# Patient Record
Sex: MALE | Race: WHITE | Employment: FULL TIME | URBAN - METROPOLITAN AREA
[De-identification: names, ages, dates, MRNs, and addresses within clinical notes are randomized per-mention and may not be internally consistent; named-entity substitution may affect disease eponyms.]

---

## 2023-07-12 ENCOUNTER — HOSPITAL ENCOUNTER (EMERGENCY)
Age: 58
Discharge: HOME | End: 2023-07-13
Attending: EMERGENCY MEDICINE
Payer: COMMERCIAL

## 2023-07-12 ENCOUNTER — APPOINTMENT (OUTPATIENT)
Dept: GENERAL RADIOLOGY | Age: 58
End: 2023-07-12
Payer: COMMERCIAL

## 2023-07-12 VITALS
DIASTOLIC BLOOD PRESSURE: 101 MMHG | HEIGHT: 71 IN | SYSTOLIC BLOOD PRESSURE: 166 MMHG | OXYGEN SATURATION: 97 % | TEMPERATURE: 97.7 F | HEART RATE: 100 BPM | BODY MASS INDEX: 39.9 KG/M2 | WEIGHT: 285 LBS | RESPIRATION RATE: 17 BRPM

## 2023-07-12 DIAGNOSIS — S99.911A INJURY OF RIGHT ANKLE, INITIAL ENCOUNTER: Primary | ICD-10-CM

## 2023-07-12 LAB
AMPHET UR QL SCN: NEGATIVE
BARBITURATES UR QL SCN: NEGATIVE
BENZODIAZ UR QL: NEGATIVE
CANNABINOIDS UR QL SCN: NEGATIVE
COCAINE UR QL SCN: NEGATIVE
FENTANYL UR QL: NEGATIVE
METHADONE UR QL: NEGATIVE
OPIATES UR QL SCN: NEGATIVE
OXYCODONE UR QL SCN: NEGATIVE
PCP UR QL SCN: NEGATIVE
TEST INFORMATION: NORMAL

## 2023-07-12 PROCEDURE — 80307 DRUG TEST PRSMV CHEM ANLYZR: CPT

## 2023-07-12 PROCEDURE — 73610 X-RAY EXAM OF ANKLE: CPT

## 2023-07-12 PROCEDURE — 99284 EMERGENCY DEPT VISIT MOD MDM: CPT

## 2023-07-12 RX ORDER — ACETAMINOPHEN 500 MG
1000 TABLET ORAL EVERY 8 HOURS PRN
Qty: 120 TABLET | Refills: 0 | Status: SHIPPED | OUTPATIENT
Start: 2023-07-12

## 2023-07-12 ASSESSMENT — PAIN DESCRIPTION - LOCATION: LOCATION: ANKLE

## 2023-07-12 ASSESSMENT — ENCOUNTER SYMPTOMS
ABDOMINAL PAIN: 0
VOMITING: 0
BACK PAIN: 0
SHORTNESS OF BREATH: 0
NAUSEA: 0

## 2023-07-12 ASSESSMENT — PAIN SCALES - GENERAL: PAINLEVEL_OUTOF10: 4

## 2023-07-13 NOTE — ED PROVIDER NOTES
[address] ED  Emergency Department Encounter  Emergency Medicine Resident     Pt Name:Osvaldo Beltre  MRN: [id number]  [facility] 1965  Date of evaluation: 7/12/23  PCP:  No primary care provider on file. Note Started: 10:25 PM EDT      CHIEF COMPLAINT       Chief Complaint   Patient presents with    Ankle Pain     Injured slipping at work; R ankle       HISTORY OF PRESENT ILLNESS  (Location/Symptom, Timing/Onset, Context/Setting, Quality, Duration, Modifying Factors, Severity.)      Bessie Zuniga is a 56 y.o. male who presents with right ankle pain and swelling. Patient was at work this evening when he slipped on a small puddle and twisted his right ankle outward. He did fall to the ground. Did not hit his head. No LOC. Not on blood thinners. Patient did take Tylenol about an hour prior to arrival.  Denies any numbness. Patient was unable to ambulate or bear weight. PAST MEDICAL / SURGICAL / SOCIAL / FAMILY HISTORY      has no past medical history on file. has no past surgical history on file. Social History     Socioeconomic History    Marital status:      Spouse name: Not on file    Number of children: Not on file    Years of education: Not on file    Highest education level: Not on file   Occupational History    Not on file   Tobacco Use    Smoking status: Never    Smokeless tobacco: Never   Substance and Sexual Activity    Alcohol use: Not Currently    Drug use: Never    Sexual activity: Not on file   Other Topics Concern    Not on file   Social History Narrative    Not on file     Social Determinants of Health     Financial Resource Strain: Not on file   Food Insecurity: Not on file   Transportation Needs: Not on file   Physical Activity: Not on file   Stress: Not on file   Social Connections: Not on file   Intimate Partner Violence: Not on file   Housing Stability: Not on file       History reviewed. No pertinent family history.     Allergies:  Advil
Tylenol with some improvement. The patient has had previous right ankle sprains but denies any previous fracture or surgery. He denies any other injury from the fall. The patient denies any hip pain, knee pain, headache, vision changes, neck pain, back pain, chest pain, shortness of breath, abdominal pain, urinary/bowel symptoms, focal weakness, numbness, tingling, or recent illness. Vital signs are stable. Heart regular rate and rhythm. Lungs clear to auscultation. Abdomen soft and nontender. No midline spinal tenderness to palpation, step-off or deformity. He has tenderness to palpation over the bilateral malleoli with associated edema. DP/PT pulses 2+/4 and equal bilaterally. Strength 5/5 with dorsi and plantarflexion of the bilateral feet. No pain over the base of the fifth metatarsal or over the proximal fibular head. No pain over the right hip or right knee. Achilles tendon is intact. Plan to obtain x-ray of the right ankle. He declines any additional pain medication. An ice pack was applied. We will complete Workmen's Comp. urine drug screen and paperwork.             Michelle Mayfield DO,  DO  Attending Emergency Physician            Michelle Mayfield DO  07/12/23 [id number]

## 2023-07-13 NOTE — DISCHARGE INSTRUCTIONS
You were seen in the emergency department for right ankle pain and swelling after slipping and falling at work this evening. X-ray of your right ankle was done which showed no acute fracture. We will place you in a walking boot and provide crutches as you are unable to bear weight and ambulate on your own. We will place you on light work duty. You may take Tylenol 1000 mg every 8 hours as needed for pain. Recommend ice and elevation. Please follow-up with orthopedic surgery in 1 week for reevaluation. Please return to the emergency department if your pain and swelling worsens or you develop any numbness in your right foot.

## 2023-07-13 NOTE — ED NOTES
The following labs labeled with pt sticker and tubed to lab:     [] Blue     [] Lavender   [] on ice  [] Green/yellow  [] Green/black [] on ice  [] Yellow  [] Red  [] Pink      [] COVID-19 swab    [] Rapid  [] PCR  [] Flu Swab  [] Strep Swab  [] Peds Viral Panel     [x] Urine Sample  [] Pelvic Cultures  [] Blood Cultures   [] Wound Cultures        Jaden Disla RN  07/12/23 [id number]

## 2023-07-13 NOTE — ED NOTES
Pt presents to the ED with c/o R ankle pain/injury. Pt states he was at work and slipped on a wet spot on the ground and fell injuring his R ankle. Pt admits twisting the ankle when he fell. Admits 4/10. Pt states he is unable to bear weight on his ankle. He admits Tylenol for pain at 1930  He denies any other complaints. He denies LOC. Whiteboard updated, vitals obtained, call light in reach.      Odalys Wolf RN  07/12/23 [id number]

## 2023-07-25 ENCOUNTER — OFFICE VISIT (OUTPATIENT)
Dept: ORTHOPEDIC SURGERY | Age: 58
End: 2023-07-25
Payer: COMMERCIAL

## 2023-07-25 VITALS — BODY MASS INDEX: 39.9 KG/M2 | OXYGEN SATURATION: 100 % | WEIGHT: 285 LBS | HEIGHT: 71 IN | RESPIRATION RATE: 16 BRPM

## 2023-07-25 DIAGNOSIS — M25.571 RIGHT ANKLE PAIN, UNSPECIFIED CHRONICITY: Primary | ICD-10-CM

## 2023-07-25 DIAGNOSIS — T14.8XXA AVULSION FRACTURE: ICD-10-CM

## 2023-07-25 DIAGNOSIS — S93.409A SPRAIN OF LIGAMENT OF ANKLE, INITIAL ENCOUNTER: Primary | ICD-10-CM

## 2023-07-25 PROCEDURE — 99204 OFFICE O/P NEW MOD 45 MIN: CPT | Performed by: ORTHOPAEDIC SURGERY

## 2023-07-25 RX ORDER — ASPIRIN 325 MG
325 TABLET, DELAYED RELEASE (ENTERIC COATED) ORAL DAILY
Qty: 42 TABLET | Refills: 0 | Status: SHIPPED | OUTPATIENT
Start: 2023-07-25

## 2023-07-26 ENCOUNTER — HOSPITAL ENCOUNTER (OUTPATIENT)
Dept: GENERAL RADIOLOGY | Age: 58
Discharge: HOME | End: 2023-07-28
Payer: COMMERCIAL

## 2023-07-26 ENCOUNTER — HOSPITAL ENCOUNTER (OUTPATIENT)
Age: 58
Discharge: HOME | End: 2023-07-28

## 2023-07-26 DIAGNOSIS — T14.8XXA FOREIGN BODY IN SKIN: Primary | ICD-10-CM

## 2023-07-26 DIAGNOSIS — T14.8XXA FOREIGN BODY IN SKIN: ICD-10-CM

## 2023-07-26 PROCEDURE — 70030 X-RAY EYE FOR FOREIGN BODY: CPT

## 2023-07-26 PROCEDURE — 73630 X-RAY EXAM OF FOOT: CPT

## 2023-07-31 ENCOUNTER — HOSPITAL ENCOUNTER (OUTPATIENT)
Dept: MRI IMAGING | Age: 58
Discharge: HOME | End: 2023-08-02
Attending: ORTHOPAEDIC SURGERY
Payer: COMMERCIAL

## 2023-07-31 DIAGNOSIS — S93.409A SPRAIN OF LIGAMENT OF ANKLE, INITIAL ENCOUNTER: ICD-10-CM

## 2023-07-31 DIAGNOSIS — T14.8XXA AVULSION FRACTURE: ICD-10-CM

## 2023-07-31 PROCEDURE — 73721 MRI JNT OF LWR EXTRE W/O DYE: CPT

## 2023-08-01 ENCOUNTER — OFFICE VISIT (OUTPATIENT)
Dept: ORTHOPEDIC SURGERY | Age: 58
End: 2023-08-01
Payer: COMMERCIAL

## 2023-08-01 VITALS — HEIGHT: 71 IN | WEIGHT: 285 LBS | OXYGEN SATURATION: 100 % | RESPIRATION RATE: 16 BRPM | BODY MASS INDEX: 39.9 KG/M2

## 2023-08-01 DIAGNOSIS — T14.8XXA AVULSION FRACTURE: ICD-10-CM

## 2023-08-01 DIAGNOSIS — S93.409A SPRAIN OF LIGAMENT OF ANKLE, INITIAL ENCOUNTER: Primary | ICD-10-CM

## 2023-08-01 DIAGNOSIS — S82.899A FRACTURE OF ANKLE, CLOSED, UNSPECIFIED LATERALITY, INITIAL ENCOUNTER: ICD-10-CM

## 2023-08-01 PROCEDURE — 99214 OFFICE O/P EST MOD 30 MIN: CPT | Performed by: ORTHOPAEDIC SURGERY

## 2023-08-01 NOTE — PROGRESS NOTES
1000 [facility] SPORTS MEDICINE  908 [facility] Kathia Padron  [address] [facility] [address] [address]  Dept: [phone]    Ambulatory Orthopedic Consult      CHIEF COMPLAINT:    Chief Complaint   Patient presents with    Ankle Pain     Right       HISTORY OF PRESENT ILLNESS:      The patient is a 56 y.o. male who is being seen for evaluation of the above, which began 7/12/2023 secondary to a twisting injury. At today's visit, he is using a rolling knee scooter. History is obtained today from:   [x]  the patient     [x]  EMR     []  one family member/friend    []  multiple family members/friends    []  other: At today's visit, the patient localizes pain to the right medial ankle/hindfoot and lateral ankle/hindfoot. INTERVAL HISTORY 8/1/2023:  He is seen again today in the office for follow up of imaging as below. Since being seen last, the patient is doing about the same overall. At today's visit, he is using a rolling knee scooter. History is obtained today from:   [x]  the patient     [x]  EMR     []  one family member/friend    []  multiple family members/friends    []  other:        REVIEW OF SYSTEMS:  Musculoskeletal: See HPI for pertinent positives     Past Medical History:    He  has a past medical history of Foreign body (FB) in soft tissue. Past Surgical History:    He  has no past surgical history on file. Current Medications:     Current Outpatient Medications:     aspirin 325 MG EC tablet, Take 1 tablet by mouth daily, Disp: 42 tablet, Rfl: 0    acetaminophen (TYLENOL) 500 MG tablet, Take 2 tablets by mouth every 8 hours as needed for Pain, Disp: 120 tablet, Rfl: 0     Allergies:    Advil [ibuprofen] and Codeine    Family History:  family history is not on file.     Social History:   Social History     Occupational History    Not on file   Tobacco Use    Smoking status: Never    Smokeless tobacco: Never   Substance and Sexual

## 2023-08-15 ENCOUNTER — TELEPHONE (OUTPATIENT)
Dept: ORTHOPEDIC SURGERY | Age: 58
End: 2023-08-15

## 2023-08-16 ENCOUNTER — TELEPHONE (OUTPATIENT)
Dept: ORTHOPEDIC SURGERY | Age: 58
End: 2023-08-16

## 2023-08-16 NOTE — TELEPHONE ENCOUNTER
Pt returned our call regarding a different issue today - in talking - he wanted to also let us know he did had a fall due to his weekness in his right ankle on 8/8/2023 - while at working changing into his work uniform to begin his shift. Pt has everything documented and is conversation with the  at his job. He is wondering what if anything he needs to do other then reporting the incident to the proper person at work - to be re-evaluated for his current issue-which potentially has caused the new issue. Since the recent incident - he is now complaining of severe pain in right knee and right hammstring.   Please advise pt - he will continue to talk with  on more details and how to proceed  Respectfully/bb

## 2023-08-16 NOTE — TELEPHONE ENCOUNTER
Pt called back - I informed him via your previous message regarding PT - which he did receive a letter from [facility] with the approval yesterday also   Respectfully/bb

## 2023-08-17 NOTE — TELEPHONE ENCOUNTER
Mariam,     If this is a new injury/claim he will need an appointment with Dr. Issac Montenegro. Have him speak with the , but it will be separate from what he has already been seen for, and possibly have a different claim # as well.

## 2023-08-18 DIAGNOSIS — S93.409A SPRAIN OF LIGAMENT OF ANKLE, INITIAL ENCOUNTER: Primary | ICD-10-CM

## 2023-08-22 ENCOUNTER — OFFICE VISIT (OUTPATIENT)
Dept: ORTHOPEDIC SURGERY | Age: 58
End: 2023-08-22
Payer: COMMERCIAL

## 2023-08-22 VITALS — WEIGHT: 285 LBS | BODY MASS INDEX: 39.9 KG/M2 | HEIGHT: 71 IN | RESPIRATION RATE: 15 BRPM

## 2023-08-22 DIAGNOSIS — S93.409A SPRAIN OF LIGAMENT OF ANKLE, INITIAL ENCOUNTER: ICD-10-CM

## 2023-08-22 DIAGNOSIS — M25.561 ACUTE PAIN OF RIGHT KNEE: ICD-10-CM

## 2023-08-22 DIAGNOSIS — M25.371 INSTABILITY OF RIGHT ANKLE JOINT: ICD-10-CM

## 2023-08-22 DIAGNOSIS — S83.8X1A INJURY OF MENISCUS OF RIGHT KNEE, INITIAL ENCOUNTER: Primary | ICD-10-CM

## 2023-08-22 DIAGNOSIS — M25.561 ACUTE PAIN OF RIGHT KNEE: Primary | ICD-10-CM

## 2023-08-22 PROCEDURE — 99214 OFFICE O/P EST MOD 30 MIN: CPT | Performed by: ORTHOPAEDIC SURGERY

## 2023-08-22 NOTE — PROGRESS NOTES
1000 [facility] AND SPORTS MEDICINE  908 [facility] Angelia Sykes  [address] [address]  Dept: [phone]    Ambulatory Orthopedic Consult      CHIEF COMPLAINT:    Chief Complaint   Patient presents with    Knee Pain     Right       HISTORY OF PRESENT ILLNESS:      The patient is a 66 y.o. male who is being seen for evaluation of the above, which began 7/12/2023 secondary to a twisting injury. At today's visit, he is using a rolling knee scooter. History is obtained today from:   [x]  the patient     [x]  EMR     []  one family member/friend    []  multiple family members/friends    []  other: At today's visit, the patient localizes pain to the right medial ankle/hindfoot and lateral ankle/hindfoot. INTERVAL HISTORY 8/1/2023:  He is seen again today in the office for follow up of imaging as below. Since being seen last, the patient is doing about the same overall. At today's visit, he is using a rolling knee scooter. 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 At today's visit, he is using a rolling knee scooter. At today's visit, the patient localizes the pain to the right knee anteriorly/medially. History is obtained today from:   [x]  the patient     []  EMR     []  one family member/friend    []  multiple family members/friends    []  other:      He also reports that he hears \"clicking\" in his knee, which is new since his injury.   He reports that during this injury, he was wearing a lace up ankle brace, however, his ankle gave out, and he describes an inversion injury of the

## 2023-08-30 ENCOUNTER — TELEPHONE (OUTPATIENT)
Dept: ORTHOPEDIC SURGERY | Age: 58
End: 2023-08-30

## 2023-08-30 NOTE — TELEPHONE ENCOUNTER
Tiffanie from [facility] calling regarding the C9 for the MRI. The patient only has allowance for the R ankle. She said we would have to file for added allowences for the knee to be covered. Any questions, call back is [phone].

## 2023-08-30 NOTE — TELEPHONE ENCOUNTER
Patient calling, seen by Dr. Marichuy Patel last week, 8/22/23. He said there was medication that was to be called in and he was supposed to be scheduled for a MRI. Please call back [phone]. In talking with the patient he told me this is WC, awaiting the C9 for the MRI. Please check on meds, antiinflamatory.

## 2023-08-30 NOTE — TELEPHONE ENCOUNTER
Tried calling patient but had to leave . When he calls back, please relay this message to him:     Explain that [facility] is calling and stating that his knee is not covered. He needs to call Tiffanie at the number in the message or he needs to call his employer to find out the status of his knee being apart of workers comp. I stated to him at his appointment that since there was not Christen Mini, this could be an issue, but he stated he made his employer aware. Right now they are denying the MRI to be completed. If his knee will not be a covered diagnosis under [facility], he will have to proceed with his private insurance. This will be his choice. I would recommend for him to speak with his employer and find out how they are proceeding with the knee, and once he has a claim #, etc for it then we can re-attempt trying to get the C-9 approved for the MRI. Right now, everything is at a standstill until we know how the patient wants to proceed with this.

## 2023-09-11 ENCOUNTER — TELEPHONE (OUTPATIENT)
Dept: ORTHOPEDIC SURGERY | Age: 58
End: 2023-09-11

## 2023-09-11 NOTE — TELEPHONE ENCOUNTER
Patient called regarding his injured right knee. Said his company is wanting to attached the right knee injury to the right ankle claim. Edgardo Terrazas he is trying to get MRI on right knee before being seen. (Appointment moved from 9/12/23 Dr. Joon Cam out of office) to 9/21/23. Can you please call and let him know if he can get MRI.

## 2023-09-13 NOTE — TELEPHONE ENCOUNTER
Spoke with patient. Informed him that as of right now, his C-9 is pending. It went from a denial to pending which tells me that his claim was accepted, and now I just have to wait for their response. I am not sure what the hold up is at this time. I have been trying to contact Lisandra with no luck on anyone returning my calls. I don't have a direct MCO for the patient. He stated he is going to contact Theodore Lewis at his employment and see if he can help in any way. Patient had no further questions, and was also give my direct line for any further questions or updates.

## 2023-10-05 ENCOUNTER — TELEPHONE (OUTPATIENT)
Dept: ORTHOPEDIC SURGERY | Age: 58
End: 2023-10-05

## 2023-10-05 NOTE — TELEPHONE ENCOUNTER
Workers comp called and gave new claim number for right knee injury. [id number]. She said to use this number for the 8/22/23 date of service and the C-9 for the MRI.

## 2023-10-06 NOTE — TELEPHONE ENCOUNTER
Received C-9 approval this morning from Lisandra. His MRI is valid from 10/5/23-10/26/23 and has been scanned in to media. I spoke with patient and informed him of this. He stated he will be calling today to get there scheduled. I told him to call our office after he has his date/time so we can get him on Dr. Vishnu Stuart schedule to review the MRI. He said he would like to go to [facility] and will be getting this scheduled. He had no further questions at this time.

## 2023-10-11 ENCOUNTER — HOSPITAL ENCOUNTER (OUTPATIENT)
Dept: MRI IMAGING | Age: 58
Discharge: HOME | End: 2023-10-13
Attending: ORTHOPAEDIC SURGERY
Payer: COMMERCIAL

## 2023-10-11 DIAGNOSIS — M25.561 ACUTE PAIN OF RIGHT KNEE: ICD-10-CM

## 2023-10-11 DIAGNOSIS — S83.8X1A INJURY OF MENISCUS OF RIGHT KNEE, INITIAL ENCOUNTER: ICD-10-CM

## 2023-10-11 PROCEDURE — 73721 MRI JNT OF LWR EXTRE W/O DYE: CPT

## 2023-10-18 DIAGNOSIS — S93.409A SPRAIN OF LIGAMENT OF ANKLE, INITIAL ENCOUNTER: Primary | ICD-10-CM

## 2023-10-19 ENCOUNTER — OFFICE VISIT (OUTPATIENT)
Dept: ORTHOPEDIC SURGERY | Age: 58
End: 2023-10-19
Payer: COMMERCIAL

## 2023-10-19 VITALS — OXYGEN SATURATION: 100 % | HEIGHT: 71 IN | BODY MASS INDEX: 40.6 KG/M2 | RESPIRATION RATE: 16 BRPM | WEIGHT: 290 LBS

## 2023-10-19 DIAGNOSIS — M25.371 INSTABILITY OF RIGHT ANKLE JOINT: ICD-10-CM

## 2023-10-19 DIAGNOSIS — S83.8X1D INJURY OF MENISCUS OF RIGHT KNEE, SUBSEQUENT ENCOUNTER: ICD-10-CM

## 2023-10-19 DIAGNOSIS — M17.10 ARTHRITIS OF KNEE: ICD-10-CM

## 2023-10-19 DIAGNOSIS — S93.409D SPRAIN OF LIGAMENT OF ANKLE, SUBSEQUENT ENCOUNTER: Primary | ICD-10-CM

## 2023-10-19 DIAGNOSIS — S82.899D FRACTURE OF ANKLE, CLOSED, UNSPECIFIED LATERALITY, WITH ROUTINE HEALING, SUBSEQUENT ENCOUNTER: ICD-10-CM

## 2023-10-19 PROCEDURE — 99214 OFFICE O/P EST MOD 30 MIN: CPT | Performed by: ORTHOPAEDIC SURGERY

## 2023-10-19 NOTE — PROGRESS NOTES
IMPRESSION:  1. Volume loss and radial type tear in the posterior horn medial meniscus  with outward extrusion of a degenerated medial meniscus body. 2. Degeneration and subtle undersurface tear in the posterior horn lateral  meniscus. 3. Mild superior and inferior patellar tendinosis. 4. Moderate tricompartmental osteoarthrosis. Small to moderate joint  effusion. Moderate edema in the subcutaneous fat along the anterior knee. 5. Moderate medial compartment chondromalacia with underlying subchondral  reactive marrow changes. Mild lateral compartment chondromalacia. Severe  lateral patellofemoral chondromalacia with underlying subcortical cystic and  subchondral reactive marrow changes. FINDINGS:  Four weightbearing views (AP, Tunnel, Lateral, and Sunrise) of the right knee were obtained in the office today and reviewed, revealing no acute fracture, dislocation, or radioopaque foreign body/tumor. Overall alignment is satisfactory. Tricompartmental degenerative changes of the knee with joint narrowing, sclerosis, and osteophytes. IMPRESSION: No acute fracture/dislocation. Degenerative changes as above. Electronically signed by Leighton Richardson MD            MRI images and radiology report reviewed, as below:    IMPRESSION:  1. Obliquely oriented, incomplete, and nondisplaced fracture of the lateral  malleolus. 2. 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 3. Sequela of chronic sprain of the anterior inferior tibiofibular ligament  which remains intact. 4. Mild tendinosis and tenosynovitis of the intact posterior tibialis tendon. 5. Mild hindfoot and mild to moderate midfoot osteoarthritis. 6. Mild Achilles tendinosis with no discrete tear identified.         FINDINGS:  Three weightbearing views (AP, Mortise, and Lateral) of the right ankle and three weightbearing views (AP, Oblique, Lateral) of

## 2023-10-20 ENCOUNTER — TELEPHONE (OUTPATIENT)
Dept: ORTHOPEDIC SURGERY | Age: 58
End: 2023-10-20

## 2023-10-20 NOTE — TELEPHONE ENCOUNTER
Pt is being referred to dr John Sanchez from dr orr for [facility] Injury of meniscus of right knee, per miryam this needs dr silva's approval prior to scheduling

## 2023-10-20 NOTE — TELEPHONE ENCOUNTER
Rosalinda at [facility] left voice mail, one C-9 was received for 2 claims, claim #s [id number] and [id number]. The C-9 came in under the ankle claim but requested PT for R ankle and Orthopedic Consult for R knee. We need to either withdraw this C-9 and create 2 new ones or we can remove the knee request from it and submit another C-9 for the knee. Please call her back at [phone] to let her know how you want to handle it. Mayur Phan from [facility] called and left message on voicemail, RE: claim #[id number], C-9 requesting PT for ankle and referral to Dr Olegario Jones for knee, claim is for ankle only. She would like to just extend the DOS on previous approval for ankle therapy but wanted to know if we want to remove the request for the referral to Dr Olegario Jones until he gets additional allowance for the knee and separate the 2 injuries. The MRI for the knee was denied because he doesn't have any allowance so this would be denied, as well. She can extend the DOS for the therapy since it hasn't been started yet.  Her call back is [phone]

## 2023-10-23 NOTE — TELEPHONE ENCOUNTER
I left a message for the patient to contact the office to schedule and appointment anytime after 11/6/23, per Dr. Loren Stevenson.

## 2023-11-02 ENCOUNTER — HOSPITAL ENCOUNTER (OUTPATIENT)
Dept: PHYSICAL THERAPY | Age: 58
Setting detail: THERAPIES SERIES
Discharge: HOME | End: 2023-11-02
Attending: ORTHOPAEDIC SURGERY
Payer: COMMERCIAL

## 2023-11-02 PROCEDURE — 97161 PT EVAL LOW COMPLEX 20 MIN: CPT

## 2023-11-02 PROCEDURE — 97016 VASOPNEUMATIC DEVICE THERAPY: CPT

## 2023-11-03 NOTE — FLOWSHEET NOTE
[facility] Fall Risk Assessment    Risk Factor Scale  Score   History of Falls [x] Yes  [] No 25  0 25   Secondary Diagnosis [x] Yes  [] No 15  0 15   Ambulatory Aid [] Furniture  [] Crutches/cane/walker  [x] None/bedrest/wheelchair/nurse 30  15  0 0   IV/Heparin Lock [] Yes  [x] No 20  0 0   Gait/Transferring [x] Impaired  [] Weak  [] Normal/bedrest/immobile 20  10  0 20   Mental Status [] Forgets limitations  [x] Oriented to own ability 15  0 0      Total: 60     Based on the Assessment score: check the appropriate box.     []  No intervention needed   Low =   Score of 0-24    []  Use standard prevention interventions Moderate =  Score of 24-44   [] Give patient handout and discuss fall prevention strategies   [] Establish goal of education for patient/family RE: fall prevention strategies    [x]  Use high risk prevention interventions High = Score of 45 and higher   [x] Give patient handout and discuss fall prevention strategies   [x] Establish goal of education for patient/family Re: fall prevention strategies   [x] Discuss lifeline / other resources    Electronically signed by:   Jairo Hyatt PT DPT  Date: 11/2/2023

## 2023-11-06 ENCOUNTER — HOSPITAL ENCOUNTER (OUTPATIENT)
Dept: PHYSICAL THERAPY | Age: 58
Setting detail: THERAPIES SERIES
Discharge: HOME | End: 2023-11-06
Attending: ORTHOPAEDIC SURGERY
Payer: COMMERCIAL

## 2023-11-06 PROCEDURE — 97110 THERAPEUTIC EXERCISES: CPT

## 2023-11-06 PROCEDURE — 97016 VASOPNEUMATIC DEVICE THERAPY: CPT

## 2023-11-06 NOTE — FLOWSHEET NOTE
[x] [facility]) - [facility] & Therapy  [address]  [place]: [phone]   F: [phone]    Physical Therapy Daily Treatment Note    Date:  2023  Patient Name:  Scooby Holloway    :  1965  MRN: [id number]  Physician: Carole Shaw MD       Medical Diagnosis: Sprain of ligament of ankle, subsequent encounter (C28.677D)           Clinical Diagnosis: (R) ankle pain (M25.571) with muscle weakness (M62.571) and walking difficulty (R26.2)  Onset date: 2023  Next Dr's appt.: TBD  PT Visit Information  PT Insurance Information:  Worker's Comp - 2-3 x a week for 4-6 weeks (2023-2023)  Total # of Visits Approved: 12  Total # of Visits to Date: 2  No Show: 0  Canceled Appointment: 0    Subjective:  Pt reports ankle has not been too painful at this time but due to not doing anything yesterday. Pain:  [x] Yes  [] No Location: R ankle    Pain Rating: (0-10 scale) 1-2/10  Pain altered Tx:  [] No  [x] Yes  Action: knee pain limiting positioning and exercises   Comments:    Objective:  Modalities:   Precautions: (R) knee injury   Exercises:  Exercise Reps/ Time Weight/ Level Comments Completed today   (R) gastroc stretch  3x30\"   W/ strap  x   (R) 4-way ankle  15 reps each dir Red  x   Standing (R) slant board stretch 30\" hold x 3 reps   x   Standing (B) heel raises 10 reps   x   Standing (B) toe raises 10 reps   x    SLS (R) LE 15\" x3      x    Mini Lunge (B) LEs 10x each      x   Mini squats 10x   In // bars x   Other:     Specific Instructions for next treatment: initiate a therapeutic exercise program - open/closed chain strengthening for the (R) ankle. Followed by Vasopneumatic compression for pain control/swelling. Assessment: [x] Progressing toward goals. Began treatment with stretching calf and 4-way ankle strengthening exercises. Notes more discomfort with inversion and pain in lateral ankle below malleoli.  In WB, pain in medial ankle and

## 2023-11-08 ENCOUNTER — HOSPITAL ENCOUNTER (OUTPATIENT)
Dept: PHYSICAL THERAPY | Age: 58
Setting detail: THERAPIES SERIES
Discharge: HOME | End: 2023-11-08
Attending: ORTHOPAEDIC SURGERY
Payer: COMMERCIAL

## 2023-11-08 ENCOUNTER — TELEPHONE (OUTPATIENT)
Dept: ORTHOPEDIC SURGERY | Age: 58
End: 2023-11-08

## 2023-11-08 PROCEDURE — 97110 THERAPEUTIC EXERCISES: CPT

## 2023-11-08 PROCEDURE — 97016 VASOPNEUMATIC DEVICE THERAPY: CPT

## 2023-11-08 NOTE — TELEPHONE ENCOUNTER
Dr. Deya Garcia,     Patient called and is needing a note for work. He is workers comp, but workers comp has denied his claim for his knee, which he is appealing. He stated that his employer is giving him a hard time. He wanted to know if you would be OK with writing a letter stating that he can ride on the back of a cart ([employer]) as a passenger. He states this will allow him to stay on second shift, and he will be able to rest his ankle and knee. I told him I had to speak with you and would let him know what you state. Please advise.

## 2023-11-08 NOTE — FLOWSHEET NOTE
[x] [facility]) - [facility] & Therapy  [address]  [place]: [phone]   F: [phone]    Physical Therapy Daily Treatment Note    Date:  2023  Patient Name:  Amina Rushing    :  1965  MRN: [id number]  Physician: Beverley Crews MD       Medical Diagnosis: Sprain of ligament of ankle, subsequent encounter (B73.322D)           Clinical Diagnosis: (R) ankle pain (M25.571) with muscle weakness (M62.571) and walking difficulty (R26.2)  Onset date: 2023  Next Dr's appt.: TBD  PT Visit Information  PT Insurance Information:  Worker's Comp - 2-3 x a week for 4-6 weeks (2023-2023)  Total # of Visits Approved: 12  Total # of Visits to Date: 3  No Show: 0  Canceled Appointment: 0    Subjective:    Patient reports today that he currently has minimal pain in R ankle. Reported pain feels more like a discomfort, although gets more intense intermittent burning sensation through ankle. Pain:  [x] Yes  [] No Location: R ankle    Pain Rating: (0-10 scale) 1-2/10  Pain altered Tx:  [] No  [x] Yes  Action: knee pain limiting positioning and exercises   Comments:    Objective:  Modalities: Vasocompression, R knee pt long sitting, x 10 min min pressure 34 degrees   Precautions: (R) knee injury   Exercises:  Exercise Reps/ Time Weight/ Level Comments Completed today   (R) gastroc stretch  3x30\"   W/ strap  x   (R) 4-way ankle  15 reps x 2 sets each dir Red  x   Seated BAPS 20x ea direction Level 2 Front/Back, Side/Side,     Standing (R) slant board stretch 30\" hold x 3 reps   x   Standing (B) heel raises 10 reps   x   Standing (B) toe raises 10 reps   x    SLS (R) LE 15\" x3      x    Mini Lunge (B) LEs 10x each      x   Mini squats 10x   In // bars x   Other:     Specific Instructions for next treatment: initiate a therapeutic exercise program - open/closed chain strengthening for the (R) ankle. Followed by Vasopneumatic compression for pain control/swelling.

## 2023-11-09 NOTE — TELEPHONE ENCOUNTER
Dr. Eric Esqueda,     Patient sent this last night through e-mail after I had sent you the original message for what he was requesting. This is what he is requesting now. He would like you to keep him off of work until his workers comp issue is resolved. Please see message below:     My employer is causing a lot of pain with my issues,   there forcing me to train in a job that will require me to be on my feet all day on days,   there is no way I can do what they're asking without risking my leg being injured,  I have hired a law firm to assist me in getting my workman's comp claim resolved.   I speculate this has caused them to stop honoring  doctors suggestions  for light duty restrictions,   the  had told me that they don't have to follow light duty restrictions but there is nothing they can do if I am removed from situation,   I am asking to be put off work under doctors care untill I have healed from my injury

## 2023-11-14 NOTE — TELEPHONE ENCOUNTER
Left VM for patient to call back. I spoke with Dr. Michelle Escalante. Unfortunately we cannot provide a note like that, and what is on his [employer]14 are the restrictions Dr. Michelle Escalante recommends. I have expressed to the patient that his employer does not have to agree to the recommendations given on the [employer] or from Dr. Michelle Escalante. When this happens, it becomes between him and the employer. Asked for patient to call me back so I can speak with him about this.

## 2023-11-15 ENCOUNTER — HOSPITAL ENCOUNTER (OUTPATIENT)
Dept: PHYSICAL THERAPY | Age: 58
Setting detail: THERAPIES SERIES
Discharge: HOME | End: 2023-11-15
Attending: ORTHOPAEDIC SURGERY
Payer: COMMERCIAL

## 2023-11-15 PROCEDURE — 97016 VASOPNEUMATIC DEVICE THERAPY: CPT

## 2023-11-15 PROCEDURE — 97110 THERAPEUTIC EXERCISES: CPT

## 2023-11-15 NOTE — FLOWSHEET NOTE
[x] [facility]) [facility] & Therapy  [address]  [place]: [phone]   F: [phone]    Physical Therapy Daily Treatment Note    Date:  11/15/2023  Patient Name:  Myra Angelo    :  1965  MRN: [id number]  Physician: Darling Colmenares MD       Medical Diagnosis: Sprain of ligament of ankle, subsequent encounter (S10.252D)           Clinical Diagnosis: (R) ankle pain (M25.571) with muscle weakness (M62.571) and walking difficulty (R26.2)  Onset date: 2023  Next Dr's appt.: TBD  PT Visit Information  PT Insurance Information:  Worker's Comp - 2-3 x a week for 4-6 weeks (2023-2023)  Total # of Visits Approved: 12  Total # of Visits to Date: 4  No Show: 0  Canceled Appointment: 0    Subjective:    11/15: Patient arrived and reports constant clicking in R ankle and R knee with all activities. Patient reports pain levels remain high and has noticed a decrease in overall function. Patient states it is difficult to even go to grocery store with daughter and riding mobile cart because of pain and decrease endurance.    Pain:  [x] Yes  [] No Location: R ankle, R knee    Pain Rating: (0-10 scale) 4/10, 5/10   Pain altered Tx:  [x] No  [] Yes   Objective:  Modalities: Vasocompression, R ankle pt long sitting, x 10 min min pressure 34 degrees   Precautions: (R) knee injury   Exercises:  Exercise Reps/ Time Weight/ Level Comments Completed today   (R) gastroc stretch  3x30\"   W/ strap  x   (R) 4-way ankle  15 reps x 2 sets each dir Red  x   Seated BAPS 20x ea direction Level 2 Front/Back, Side/Side,  x   Standing (R) slant board stretch 30\" hold x 3 reps   x   Standing (B) heel raises 10 reps   x   Standing (B) toe raises 10 reps   x    SLS (R) LE 15\" x3      x    Mini Lunge (B) LEs 10x each         Mini squats 10x   In // bars x   Other:     Specific Instructions for next treatment: initiate a therapeutic exercise program - open/closed chain strengthening for the

## 2023-11-20 ENCOUNTER — HOSPITAL ENCOUNTER (OUTPATIENT)
Dept: PHYSICAL THERAPY | Age: 58
Setting detail: THERAPIES SERIES
Discharge: HOME | End: 2023-11-20
Attending: ORTHOPAEDIC SURGERY
Payer: COMMERCIAL

## 2023-11-20 PROCEDURE — 97110 THERAPEUTIC EXERCISES: CPT

## 2023-11-20 NOTE — FLOWSHEET NOTE
[x] [facility]  [address]  [place]: [phone]   F: [phone]    Physical Therapy Daily Treatment Note    Date:  2023  Patient Name:  Paula Murguia    :  1965  MRN: [id number]  Physician: Sabino Ware MD       Medical Diagnosis: Sprain of ligament of ankle, subsequent encounter (D88.678X)           Clinical Diagnosis: (R) ankle pain (M25.571) with muscle weakness (M62.571) and walking difficulty (R26.2)  Onset date: 2023  Next Dr's appt.: TBD  PT Visit Information  PT Insurance Information:  Worker's Comp - 2-3 x a week for 4-6 weeks (2023-2023)  Total # of Visits Approved: 12  Total # of Visits to Date: 5  No Show: 1  Canceled Appointment: 0    Subjective:    : Patient reports today that R ankle feels pretty well overall. Reported that ankle gets achy if he keeps it in same position for longer periods. Pain:  [x] Yes  [] No Location: R ankle, R knee    Pain Rating: (0-10 scale) 2/10 R ankle, 5/10   Pain altered Tx:  [x] No  [] Yes   Objective:  Modalities:   Precautions: (R) knee injury   Exercises:  Exercise Reps/ Time Weight/ Level Comments Completed today   (R) gastroc stretch  3x30\"   W/ strap  x   (R) 4-way ankle  15 reps x 2 sets each dir Red  x   Seated BAPS 20x ea direction Level 2 Front/Back, Side/Side, clockwise, counterclockwise  x   Standing (R) slant board stretch 30\" hold x 3 reps   x   Standing (B) heel raises 10 reps x 2 sets   x   Standing (B) toe raises 10 reps x 2 sets   x    SLS (R) LE 30\" x3      x    Mini Lunge (B) LEs 10x each         Mini squats 10x   In // bars x   Total Gym Squats 10 reps x 2 sets   x   Other:     Specific Instructions for next treatment: initiate a therapeutic exercise program - open/closed chain strengthening for the (R) ankle. Followed by Vasopneumatic compression for pain control/swelling. Assessment: [x] Progressing toward goals.  Continued with exercise

## 2023-11-29 ENCOUNTER — HOSPITAL ENCOUNTER (OUTPATIENT)
Dept: PHYSICAL THERAPY | Age: 58
Setting detail: THERAPIES SERIES
Discharge: HOME | End: 2023-11-29
Attending: ORTHOPAEDIC SURGERY
Payer: COMMERCIAL

## 2023-11-29 PROCEDURE — 97016 VASOPNEUMATIC DEVICE THERAPY: CPT

## 2023-11-29 PROCEDURE — 97110 THERAPEUTIC EXERCISES: CPT

## 2023-11-29 NOTE — FLOWSHEET NOTE
[x] [facility]) [facility] & Therapy  [address]  [place]: [phone]   F: [phone]    Physical Therapy Daily Treatment Note    Date:  2023  Patient Name:  Jeremías Berry    :  1965  MRN: [id number]  Physician: Tim Link MD       Medical Diagnosis: Sprain of ligament of ankle, subsequent encounter (C67.490D)           Clinical Diagnosis: (R) ankle pain (M25.571) with muscle weakness (M62.571) and walking difficulty (R26.2)  Onset date: 2023  Next Dr's appt.: TBD  PT Visit Information  PT Insurance Information:  Worker's Comp - 2-3 x a week for 4-6 weeks (2023-2023)  Total # of Visits Approved: 12  Total # of Visits to Date: 6  No Show: 1  Canceled Appointment: 0    Subjective:    : Patient stating he was in extreme pain last night with no significant reason. Patient states it has since subsided. Pain:  [x] Yes  [] No Location: R ankle, R knee    Pain Rating: (0-10 scale) 1-2/10 R ankle, 1-2/10 knee  Pain altered Tx:  [x] No  [] Yes   Objective:  Modalities: Vaso to R ankle, low compression, 34 degrees  Precautions: (R) knee injury   Exercises:  Exercise Reps/ Time Weight/ Level Comments Completed today   (R) gastroc stretch  3x30\"   W/ strap  x   (R) 4-way ankle  15 reps x 2 sets each dir Green  x   Seated BAPS 20x ea direction Level 2 Front/Back, Side/Side, clockwise, counterclockwise  x   Standing (R) slant board stretch 30\" hold x 3 reps      Standing (B) heel raises 10 reps x 2 sets   x   Standing (B) toe raises 10 reps x 2 sets   x    SLS (R) LE 30\" x3          Mini Lunge (B) LEs 10x each     unable to complete     Mini squats 10x   In // bars x   Total Gym Squats 10 reps x 2 sets      Other:     Specific Instructions for next treatment: open/closed chain strengthening for the (R) ankle. Followed by Vasopneumatic compression for pain control/swelling. Assessment: [x] Progressing toward goals.      Began session

## 2023-12-01 ENCOUNTER — HOSPITAL ENCOUNTER (OUTPATIENT)
Dept: PHYSICAL THERAPY | Age: 58
Setting detail: THERAPIES SERIES
Discharge: HOME | End: 2023-12-01
Attending: ORTHOPAEDIC SURGERY
Payer: COMMERCIAL

## 2023-12-01 PROCEDURE — 97110 THERAPEUTIC EXERCISES: CPT

## 2023-12-01 PROCEDURE — 97016 VASOPNEUMATIC DEVICE THERAPY: CPT

## 2023-12-01 NOTE — FLOWSHEET NOTE
[x] [facility]) [facility] & Therapy  [address]  [place]: [phone]   F: [phone]    Physical Therapy Daily Treatment Note    Date:  2023  Patient Name:  Yoni Wu    :  1965  MRN: [id number]  Physician: Fransico Rand MD       Medical Diagnosis: Sprain of ligament of ankle, subsequent encounter (N72.746D)           Clinical Diagnosis: (R) ankle pain (M25.571) with muscle weakness (M62.571) and walking difficulty (R26.2)  Onset date: 2023  Next Dr's appt.: TBD  PT Visit Information  PT Insurance Information:  Worker's Comp - 2-3 x a week for 4-6 weeks (2023-2023)  Total # of Visits Approved: 12  Total # of Visits to Date: 7  No Show: 1  Canceled Appointment: 0    Subjective:    : Patient arrived and reports pain levels remain unchanged. States he is working and continues to have difficulty with the job he is given even though it should be considered \"easy\". Patient states he continues to have swelling in R ankle and if not ankle then R knee.    Pain:  [x] Yes  [] No Location: R ankle, R knee    Pain Rating: (0-10 scale) 1-2/10 R ankle, 1-2/10 knee  Pain altered Tx:  [x] No  [] Yes   Objective:  Modalities: Vaso to R ankle, low compression, 34 degrees for 10 minutes   Precautions: (R) knee injury   Exercises:  Exercise Reps/ Time Weight/ Level Comments Completed today   (R) gastroc stretch  3x30\"   W/ strap  x   (R) 4-way ankle  15 reps x 2 sets each dir Green  x   Seated BAPS 20x ea direction Level 2 Front/Back, Side/Side, clockwise, counterclockwise  x   Standing (R) slant board stretch 30\" hold x 3 reps   x   Standing (B) heel raises 10 reps x 2 sets   x   Standing (B) toe raises 10 reps x 2 sets   x    SLS (R) LE 30\" x3          Mini Lunge (B) LEs 10x each     unable to complete     Mini squats 10x   In // bars x   B 3 way hip 10 reps each leg    x   Total Gym Squats 10 reps x 2 sets      Other:     Specific Instructions

## 2023-12-04 ENCOUNTER — HOSPITAL ENCOUNTER (OUTPATIENT)
Dept: PHYSICAL THERAPY | Age: 58
Setting detail: THERAPIES SERIES
Discharge: HOME | End: 2023-12-04
Attending: ORTHOPAEDIC SURGERY
Payer: COMMERCIAL

## 2023-12-04 PROCEDURE — 97110 THERAPEUTIC EXERCISES: CPT

## 2023-12-04 PROCEDURE — 97016 VASOPNEUMATIC DEVICE THERAPY: CPT

## 2023-12-04 NOTE — THERAPY EVALUATION
the patient struggling today throughout each plane of motion. Followed up with Vasopneumatic compression for pain control. Goals   STG: (to be met in 6 treatments)  Patient will report an average pain level of a 2-3/10 within the (R) ankle at rest/while performing his ADLs. (Met)   Patient will demonstrate knowledge of fall prevention. (Met)        Patient goals: To get better    Patient demonstrated improvement from condition with _2_ of _2_ short term goals     Comments/Function: Functionally unchanged since his evaluation. Pt. Education:  [x] Plans/Goals, Risks/Benefits discussed  [] Home exercise program    Method of Education: [] Verbal  [] Demo  [] Written  Comprehension of Education:  [] Verbalizes understanding. [] Demonstrates understanding. [] Needs Review. [] Demonstrates/verbalizes understanding of HEP/Ed previously given. Recommendations: Continue with current prescription to further address the physical impairments and activity limitations that are still present. [] Patient is Discharged At This Time Unless Further Orders Are Received. New Script Needed To Continue Treatment: [] No   [] Yes  (visits left on current prescription:      4         ) Please sign orders at the bottom of this page and return by faxing. Thank you.      Current Treatment:  [x] Therapeutic Exercise    [] Modalities                [] Work Conditioning  [] Therapeutic Activity    [] Ultrasound  [] Electrical Stimulation  [] Gait Training     [] Massage       [] Lumbar/Cervical Traction  [x] Neuromuscular Re-education [] Cold/hotpack [] Iontophoresis: 4 mg/mL  [x] Instruction in Home Exercise Program                     Dexamethasone Sodium  [] Manual Therapy             Phosphate 40-80 mAmin  [] Aquatic Therapy                                 [x] Vaso Pneumatic compression  [] Other:  More objective information is available upon request.    Medicare/Regulatory Requirements:  I have reviewed this plan of care and

## 2023-12-06 NOTE — CARE COORDINATION
[] [facility]) - [facility] & Therapy  [address]  [place]: [phone]   F: [phone]     Physical Therapy Communication    Date: 2023  Patient: Antonietta Hammans  : 1965  MRN: [id number]  Claim # [id number]    Visit Count:   Cancels/No Shows to date:     Contacted [employer] @ [facility] to extend Osvaldo's C-9 from 23 to 23 as he did not start PT until 23. He will have 3 visits remaining after this week. H[employer] MCKINNEY is out of the office until 23. Contacted customer service at [phone] to request extension. I was referred to [facility]- Her direct line is  [phone]. Left a voicemail for [facility] requesting date extension for PT though 23. Provided my call back number and fax number if she is able to approve.        23 C-9 extended through 23 per C-9    Electronically signed by: Riri Aguilar PTA

## 2023-12-08 ENCOUNTER — HOSPITAL ENCOUNTER (OUTPATIENT)
Dept: PHYSICAL THERAPY | Age: 58
Setting detail: THERAPIES SERIES
Discharge: HOME | End: 2023-12-08
Attending: ORTHOPAEDIC SURGERY
Payer: COMMERCIAL

## 2023-12-08 PROCEDURE — 97110 THERAPEUTIC EXERCISES: CPT

## 2023-12-08 NOTE — FLOWSHEET NOTE
[x] [facility]) [facility] & Therapy  [address]  [place]: [phone]   F: [phone]    Physical Therapy Daily Treatment Note    Date:  2023  Patient Name:  Reji Serrano    :  1965  MRN: [id number]  Physician: Rabia Foote MD       Medical Diagnosis: Sprain of ligament of ankle, subsequent encounter (E65.297N)           Clinical Diagnosis: (R) ankle pain (M25.571) with muscle weakness (M62.571) and walking difficulty (R26.2)  Onset date: 2023  Next Dr's appt.: TBD  PT Visit Information  PT Insurance Information:  Worker's Comp - 2-3 x a week for 4-6 weeks (2023-2023)  Total # of Visits Approved: 12  Total # of Visits to Date: 9  No Show: 1  Canceled Appointment: 0    Subjective:    : Patient reporting when he stood up from a stool and felt like his R foot and ankle \"\" for a second and hurt for the rest of the day. Patient reporting the sensation surprised him and he was able to brace himself between counters.       Pain:  [x] Yes  [] No Location: R ankle, R knee    Pain Rating: (0-10 scale) 1-2/10 R ankle, 2/10 knee  Pain altered Tx:  [x] No  [] Yes   Objective:  Modalities: Vaso to R ankle, low compression, 34 degrees for 10 minutes-held 23  Precautions: (R) knee injury   Exercises:  Exercise Reps/ Time Weight/ Level Comments Completed today   (R) gastroc stretch  3x30\"   W/ strap  x   (R) 4-way ankle  15 reps x 2 sets each dir Green  x   Seated BAPS 20x ea direction Level 2 Front/Back, Side/Side, clockwise, counterclockwise   cues to hold knee to prevent compensation x   Standing (R) slant board stretch 30\" hold x 3 reps   x   Standing (B) heel raises 10 reps x 2 sets   x   Standing (B) toe raises 10 reps x 2 sets   x    SLS (R) LE 30\" x3          Mini Lunge (B) LEs 10x each     unable to complete     Mini squats 10x   In // bars x   B 3 way hip 10 reps each leg    1 episode of knee buckle in R knee x

## 2023-12-13 ENCOUNTER — HOSPITAL ENCOUNTER (OUTPATIENT)
Dept: PHYSICAL THERAPY | Age: 58
Setting detail: THERAPIES SERIES
Discharge: HOME | End: 2023-12-13
Attending: ORTHOPAEDIC SURGERY
Payer: COMMERCIAL

## 2023-12-13 PROCEDURE — 97113 AQUATIC THERAPY/EXERCISES: CPT

## 2023-12-13 PROCEDURE — 97110 THERAPEUTIC EXERCISES: CPT

## 2024-01-19 ENCOUNTER — TELEPHONE (OUTPATIENT)
Dept: ORTHOPEDIC SURGERY | Age: 59
End: 2024-01-19

## 2024-01-19 NOTE — TELEPHONE ENCOUNTER
I received a call from Domonique @ [employer] (Ph #: [phone]). She stated that for this patient, his knee claim has been dismissed and there is nothing further they are addressing with this. She expressed that his ankle issue needs to be addressed at his follow up on 1/25/24 and she is hoping that there will be some kind of resolution.     If patient is wanting to discuss his knee, it will be under his private insurance. Patient is able to appeal, but she states right now there is nothing showing he is contesting the knee claim.

## 2024-01-25 ENCOUNTER — OFFICE VISIT (OUTPATIENT)
Dept: ORTHOPEDIC SURGERY | Age: 59
End: 2024-01-25
Payer: COMMERCIAL

## 2024-01-25 VITALS — RESPIRATION RATE: 16 BRPM | HEIGHT: 70 IN | BODY MASS INDEX: 41.52 KG/M2 | WEIGHT: 290 LBS | OXYGEN SATURATION: 100 %

## 2024-01-25 DIAGNOSIS — S82.899D FRACTURE OF ANKLE, CLOSED, UNSPECIFIED LATERALITY, WITH ROUTINE HEALING, SUBSEQUENT ENCOUNTER: ICD-10-CM

## 2024-01-25 DIAGNOSIS — M25.371 INSTABILITY OF RIGHT ANKLE JOINT: ICD-10-CM

## 2024-01-25 DIAGNOSIS — S93.409D SPRAIN OF LIGAMENT OF ANKLE, SUBSEQUENT ENCOUNTER: Primary | ICD-10-CM

## 2024-01-25 PROCEDURE — 99212 OFFICE O/P EST SF 10 MIN: CPT | Performed by: ORTHOPAEDIC SURGERY

## 2024-01-25 NOTE — PROGRESS NOTES
[facility] PHYSICIANS [facility] ORTHOPEDICS AND SPORTS MEDICINE  [address]  [facility] [facility] [place] [address]  Dept: [phone]    Ambulatory Orthopedic Consult      CHIEF COMPLAINT:    Chief Complaint   Patient presents with    Ankle Pain     Right       HISTORY OF PRESENT ILLNESS:      The patient is a 58 y.o. male who is being seen for evaluation of the above, which began 7/12/2023 secondary to a twisting injury. At today's visit, he is using a rolling knee scooter.     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    INTERVAL HISTORY 8/1/2023:  He is seen again today in the office for follow up of imaging as below. Since being seen last, the patient is doing about the same overall. At today's visit, he is using a rolling knee scooter.    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 At today's visit, he is using a rolling knee scooter. At today's visit, the patient localizes the pain to the right knee anteriorly/medially.     History is obtained today from:   [x]  the patient     []  EMR     []  one family member/friend    []  multiple family members/friends    []  other:      He also reports that he hears \"clicking\" in his knee, which is new since his injury.  He reports that during this injury, he was wearing a lace up ankle brace, however, his ankle gave out, and he describes an inversion injury of the

## 2024-07-29 ENCOUNTER — TELEPHONE (OUTPATIENT)
Dept: ORTHOPEDIC SURGERY | Age: 59
End: 2024-07-29

## 2024-07-29 NOTE — TELEPHONE ENCOUNTER
Patient would like to schedule workers comp appointment with Dr. Talavera for right knee.     Injury date: 08/2023  Claim # [id number]  MRI 10/11/2023  Saw Dr. Frey     Please call [phone]

## 2024-07-30 NOTE — TELEPHONE ENCOUNTER
Claim has meniscus tear allowed.  Per Dr Talavera ok to see.  Called and left voicemail for patient to call back and schedule appointment.

## 2024-08-21 ENCOUNTER — OFFICE VISIT (OUTPATIENT)
Dept: ORTHOPEDIC SURGERY | Age: 59
End: 2024-08-21
Payer: COMMERCIAL

## 2024-08-21 DIAGNOSIS — S83.8X1D INJURY OF MENISCUS OF RIGHT KNEE, SUBSEQUENT ENCOUNTER: Primary | ICD-10-CM

## 2024-08-21 PROCEDURE — 99213 OFFICE O/P EST LOW 20 MIN: CPT | Performed by: ORTHOPAEDIC SURGERY

## 2024-08-21 NOTE — PROGRESS NOTES
[facility] Orthopedics & Sports Medicine                   Reji Talavera M.D.            [address], [address]               [place], [place] [address]           Dept Phone: [phone]           Dept Fax:  [phone] [address]                       [address]           [place], [place] [address]          Dept Phone: [phone]           Dept Fax:  [phone]      Chief Compliant:  Chief Complaint   Patient presents with    Pain     Rt knee        History of Present Illness:  This is a 59 y.o. male who presents to the clinic today for evaluation / follow up of right knee injury.  Patient is a 59-year-old gentleman who injured his knee on 8/23/2023 when he had a fall.  He is originally was seen by Dr. Frey who has since moved away from a [facility] area patient's been having to deal with a Worker's Comp. for quite some time and he finally got approval for this as well as approval for MRI he did have an MRI performed on 10/11/2023 as indicated below    Patient does state that he gets sharp stabbing catching pain when he twists or turns when he is on even surfaces or getting out of chairs..       Review of Systems   Constitutional: Negative for fever, chills, sweats.   Eyes: Negative for changes in vision, or pain.   HENT: Negative for ear ache, epistaxis, or sore throat.  Respiratory/Cardio: Negative for Chest pain, palpitations, SOB, or cough.  Gastrointestinal: Negative for abdominal pain, N/V/D.   Genitourinary: Negative for dysuria, frequency, urgency, or hematuria.   Neurological: Negative for headache, numbness, or weakness.   Integumentary: Negative for rash, itching, laceration, or abrasion.   Musculoskeletal: Positive for Pain (Rt knee)       Physical Exam:  Constitutional: Patient is oriented to person, place, and time. Patient appears well-developed and well nourished.   HENT: Negative otherwise noted  Head: Normocephalic and Atraumatic  Nose: Normal  Eyes: Conjunctivae

## 2024-09-05 ENCOUNTER — TELEPHONE (OUTPATIENT)
Dept: ORTHOPEDIC SURGERY | Age: 59
End: 2024-09-05

## 2024-09-24 RX ORDER — HYDROCHLOROTHIAZIDE 12.5 MG/1
12.5 CAPSULE ORAL DAILY PRN
COMMUNITY
Start: 2024-04-29 | End: 2024-09-30

## 2024-09-24 RX ORDER — MELOXICAM 15 MG/1
TABLET ORAL
COMMUNITY
Start: 2024-07-23 | End: 2024-09-30

## 2024-09-24 RX ORDER — AMLODIPINE BESYLATE 5 MG/1
5 TABLET ORAL DAILY
COMMUNITY
Start: 2024-04-29 | End: 2024-09-30

## 2024-09-24 RX ORDER — GABAPENTIN 100 MG/1
CAPSULE ORAL
COMMUNITY
Start: 2024-07-23 | End: 2024-09-30

## 2024-09-30 ENCOUNTER — HOSPITAL ENCOUNTER (OUTPATIENT)
Dept: PREADMISSION TESTING | Age: 59
Discharge: HOME | End: 2024-10-04
Attending: ORTHOPAEDIC SURGERY | Admitting: ORTHOPAEDIC SURGERY
Payer: COMMERCIAL

## 2024-09-30 VITALS
HEIGHT: 71 IN | WEIGHT: 287 LBS | DIASTOLIC BLOOD PRESSURE: 91 MMHG | OXYGEN SATURATION: 97 % | SYSTOLIC BLOOD PRESSURE: 146 MMHG | TEMPERATURE: 97 F | BODY MASS INDEX: 40.18 KG/M2 | RESPIRATION RATE: 18 BRPM | HEART RATE: 77 BPM

## 2024-09-30 LAB
ANION GAP SERPL CALCULATED.3IONS-SCNC: 11 MMOL/L (ref 9–17)
BASOPHILS # BLD: 0.1 K/UL (ref 0–0.2)
BASOPHILS NFR BLD: 1 % (ref 0–2)
BUN SERPL-MCNC: 22 MG/DL (ref 6–20)
CALCIUM SERPL-MCNC: 9.4 MG/DL (ref 8.6–10.4)
CHLORIDE SERPL-SCNC: 101 MMOL/L (ref 98–107)
CO2 SERPL-SCNC: 26 MMOL/L (ref 20–31)
CREAT SERPL-MCNC: 0.9 MG/DL (ref 0.7–1.2)
EOSINOPHIL # BLD: 0.2 K/UL (ref 0–0.4)
EOSINOPHILS RELATIVE PERCENT: 4 % (ref 0–4)
ERYTHROCYTE [DISTWIDTH] IN BLOOD BY AUTOMATED COUNT: 13.9 % (ref 11.5–14.9)
GFR, ESTIMATED: >90 ML/MIN/1.73M2
GLUCOSE SERPL-MCNC: 138 MG/DL (ref 70–99)
HCT VFR BLD AUTO: 49.1 % (ref 41–53)
HGB BLD-MCNC: 16.2 G/DL (ref 13.5–17.5)
LYMPHOCYTES NFR BLD: 2.1 K/UL (ref 1–4.8)
LYMPHOCYTES RELATIVE PERCENT: 37 % (ref 24–44)
MCH RBC QN AUTO: 29.9 PG (ref 26–34)
MCHC RBC AUTO-ENTMCNC: 33 G/DL (ref 31–37)
MCV RBC AUTO: 90.6 FL (ref 80–100)
MONOCYTES NFR BLD: 0.7 K/UL (ref 0.1–1.3)
MONOCYTES NFR BLD: 12 % (ref 1–7)
NEUTROPHILS NFR BLD: 46 % (ref 36–66)
NEUTS SEG NFR BLD: 2.7 K/UL (ref 1.3–9.1)
PLATELET # BLD AUTO: 325 K/UL (ref 150–450)
PMV BLD AUTO: 7.4 FL (ref 6–12)
POTASSIUM SERPL-SCNC: 4.6 MMOL/L (ref 3.7–5.3)
RBC # BLD AUTO: 5.43 M/UL (ref 4.5–5.9)
SODIUM SERPL-SCNC: 138 MMOL/L (ref 135–144)
WBC OTHER # BLD: 5.9 K/UL (ref 3.5–11)

## 2024-09-30 PROCEDURE — 93005 ELECTROCARDIOGRAM TRACING: CPT

## 2024-09-30 PROCEDURE — 80048 BASIC METABOLIC PNL TOTAL CA: CPT

## 2024-09-30 PROCEDURE — 36415 COLL VENOUS BLD VENIPUNCTURE: CPT

## 2024-09-30 PROCEDURE — 85025 COMPLETE CBC W/AUTO DIFF WBC: CPT

## 2024-09-30 RX ORDER — DICLOFENAC SODIUM 75 MG/1
TABLET, DELAYED RELEASE ORAL 2 TIMES DAILY
COMMUNITY
Start: 2024-08-07

## 2024-09-30 RX ORDER — LISINOPRIL 20 MG/1
20 TABLET ORAL DAILY
COMMUNITY
Start: 2024-09-16

## 2024-09-30 NOTE — DISCHARGE INSTRUCTIONS
holding area where you will be prepared for surgery.  A physical assessment will be performed by a nurse practitioner or house officer.  Your IV will be started and you will meet your anesthesiologist.    When you go to surgery, your family will be directed to the surgical waiting room, where the doctor should speak with them after your surgery.    After surgery, you will be taken to the recovery area.  When you are alert and stable, you will receive instructions and be prepared for discharge.     If you use a Bi-PAP or C-PAP machine, please bring it with you and leave it in the car in case it is needed in recovery room.

## 2024-10-01 LAB
EKG ATRIAL RATE: 75 BPM
EKG P AXIS: 16 DEGREES
EKG P-R INTERVAL: 152 MS
EKG Q-T INTERVAL: 364 MS
EKG QRS DURATION: 90 MS
EKG QTC CALCULATION (BAZETT): 406 MS
EKG R AXIS: -38 DEGREES
EKG T AXIS: 53 DEGREES
EKG VENTRICULAR RATE: 75 BPM

## 2024-10-10 ENCOUNTER — TELEPHONE (OUTPATIENT)
Dept: ORTHOPEDIC SURGERY | Age: 59
End: 2024-10-10

## 2024-10-11 ENCOUNTER — ANESTHESIA EVENT (OUTPATIENT)
Dept: OPERATING ROOM | Age: 59
End: 2024-10-11
Payer: COMMERCIAL

## 2024-10-11 NOTE — PRE-PROCEDURE INSTRUCTIONS
No answer, left message ?  YES                           Unable to leave message ?    When were you told to arrive at hospital ?      Do you have a  ?    Are you on any blood thinners ?                     If yes when did you stop taking ?    Do you have your prep Rx filled and instruction ?      Nothing to eat the day before , only clear liquids.    Are you experiencing any covid symptoms ?     Do you have any infections or rash we should be aware of ?      Do you have the Hibiclens soap to use the night before and the morning of surgery ?    Nothing to eat or drink after midnight, only a sip of water to take any medication instructed to take the night before.  Wear comfortable clothing, leave any valuables at home, remove any jewelry and body piercing . LEFT A  WITH SURGERY AND ARRIVAL TIME OF 1130

## 2024-10-14 ENCOUNTER — HOSPITAL ENCOUNTER (OUTPATIENT)
Age: 59
Setting detail: OUTPATIENT SURGERY
Discharge: HOME | End: 2024-10-14
Attending: ORTHOPAEDIC SURGERY | Admitting: ORTHOPAEDIC SURGERY
Payer: COMMERCIAL

## 2024-10-14 ENCOUNTER — ANESTHESIA (OUTPATIENT)
Dept: OPERATING ROOM | Age: 59
End: 2024-10-14
Payer: COMMERCIAL

## 2024-10-14 VITALS
SYSTOLIC BLOOD PRESSURE: 150 MMHG | WEIGHT: 287 LBS | RESPIRATION RATE: 16 BRPM | HEART RATE: 86 BPM | DIASTOLIC BLOOD PRESSURE: 85 MMHG | BODY MASS INDEX: 40.18 KG/M2 | TEMPERATURE: 97.2 F | OXYGEN SATURATION: 94 % | HEIGHT: 71 IN

## 2024-10-14 DIAGNOSIS — S83.241A ACUTE MEDIAL MENISCUS TEAR OF RIGHT KNEE, INITIAL ENCOUNTER: Primary | ICD-10-CM

## 2024-10-14 PROCEDURE — 7100000031 HC ASPR PHASE II RECOVERY - ADDTL 15 MIN: Performed by: ORTHOPAEDIC SURGERY

## 2024-10-14 PROCEDURE — 6360000002 HC RX W HCPCS: Performed by: ANESTHESIOLOGY

## 2024-10-14 PROCEDURE — 7100000030 HC ASPR PHASE II RECOVERY - FIRST 15 MIN: Performed by: ORTHOPAEDIC SURGERY

## 2024-10-14 PROCEDURE — 6370000000 HC RX 637 (ALT 250 FOR IP): Performed by: ANESTHESIOLOGY

## 2024-10-14 PROCEDURE — 3600000013 HC SURGERY LEVEL 3 ADDTL 15MIN: Performed by: ORTHOPAEDIC SURGERY

## 2024-10-14 PROCEDURE — 3700000001 HC ADD 15 MINUTES (ANESTHESIA): Performed by: ORTHOPAEDIC SURGERY

## 2024-10-14 PROCEDURE — 2580000003 HC RX 258: Performed by: ANESTHESIOLOGY

## 2024-10-14 PROCEDURE — 6360000002 HC RX W HCPCS: Performed by: ORTHOPAEDIC SURGERY

## 2024-10-14 PROCEDURE — 2709999900 HC NON-CHARGEABLE SUPPLY: Performed by: ORTHOPAEDIC SURGERY

## 2024-10-14 PROCEDURE — 7100000000 HC PACU RECOVERY - FIRST 15 MIN: Performed by: ORTHOPAEDIC SURGERY

## 2024-10-14 PROCEDURE — 6360000002 HC RX W HCPCS: Performed by: NURSE ANESTHETIST, CERTIFIED REGISTERED

## 2024-10-14 PROCEDURE — 7100000001 HC PACU RECOVERY - ADDTL 15 MIN: Performed by: ORTHOPAEDIC SURGERY

## 2024-10-14 PROCEDURE — 7100000011 HC PHASE II RECOVERY - ADDTL 15 MIN: Performed by: ORTHOPAEDIC SURGERY

## 2024-10-14 PROCEDURE — 2500000003 HC RX 250 WO HCPCS: Performed by: NURSE ANESTHETIST, CERTIFIED REGISTERED

## 2024-10-14 PROCEDURE — 3600000003 HC SURGERY LEVEL 3 BASE: Performed by: ORTHOPAEDIC SURGERY

## 2024-10-14 PROCEDURE — 3700000000 HC ANESTHESIA ATTENDED CARE: Performed by: ORTHOPAEDIC SURGERY

## 2024-10-14 PROCEDURE — 7100000010 HC PHASE II RECOVERY - FIRST 15 MIN: Performed by: ORTHOPAEDIC SURGERY

## 2024-10-14 RX ORDER — LIDOCAINE HYDROCHLORIDE 10 MG/ML
1 INJECTION, SOLUTION EPIDURAL; INFILTRATION; INTRACAUDAL; PERINEURAL
Status: DISCONTINUED | OUTPATIENT
Start: 2024-10-14 | End: 2024-10-14 | Stop reason: HOSPADM

## 2024-10-14 RX ORDER — HYDROCODONE BITARTRATE AND ACETAMINOPHEN 5; 325 MG/1; MG/1
1 TABLET ORAL
Status: COMPLETED | OUTPATIENT
Start: 2024-10-14 | End: 2024-10-14

## 2024-10-14 RX ORDER — SODIUM CHLORIDE 0.9 % (FLUSH) 0.9 %
5-40 SYRINGE (ML) INJECTION PRN
Status: DISCONTINUED | OUTPATIENT
Start: 2024-10-14 | End: 2024-10-14 | Stop reason: HOSPADM

## 2024-10-14 RX ORDER — ONDANSETRON 2 MG/ML
4 INJECTION INTRAMUSCULAR; INTRAVENOUS
Status: DISCONTINUED | OUTPATIENT
Start: 2024-10-14 | End: 2024-10-14 | Stop reason: HOSPADM

## 2024-10-14 RX ORDER — ONDANSETRON 2 MG/ML
INJECTION INTRAMUSCULAR; INTRAVENOUS
Status: DISCONTINUED | OUTPATIENT
Start: 2024-10-14 | End: 2024-10-14 | Stop reason: SDUPTHER

## 2024-10-14 RX ORDER — GABAPENTIN 300 MG/1
300 CAPSULE ORAL ONCE
Status: COMPLETED | OUTPATIENT
Start: 2024-10-14 | End: 2024-10-14

## 2024-10-14 RX ORDER — DIPHENHYDRAMINE HYDROCHLORIDE 50 MG/ML
12.5 INJECTION INTRAMUSCULAR; INTRAVENOUS
Status: DISCONTINUED | OUTPATIENT
Start: 2024-10-14 | End: 2024-10-14 | Stop reason: HOSPADM

## 2024-10-14 RX ORDER — HYDROCODONE BITARTRATE AND ACETAMINOPHEN 5; 325 MG/1; MG/1
1 TABLET ORAL EVERY 6 HOURS PRN
Qty: 20 TABLET | Refills: 0 | Status: SHIPPED | OUTPATIENT
Start: 2024-10-14 | End: 2024-10-19

## 2024-10-14 RX ORDER — FENTANYL CITRATE 50 UG/ML
INJECTION, SOLUTION INTRAMUSCULAR; INTRAVENOUS
Status: DISCONTINUED | OUTPATIENT
Start: 2024-10-14 | End: 2024-10-14 | Stop reason: SDUPTHER

## 2024-10-14 RX ORDER — SODIUM CHLORIDE 0.9 % (FLUSH) 0.9 %
5-40 SYRINGE (ML) INJECTION EVERY 12 HOURS SCHEDULED
Status: DISCONTINUED | OUTPATIENT
Start: 2024-10-14 | End: 2024-10-14 | Stop reason: HOSPADM

## 2024-10-14 RX ORDER — ROPIVACAINE HYDROCHLORIDE 5 MG/ML
INJECTION, SOLUTION EPIDURAL; INFILTRATION; PERINEURAL PRN
Status: DISCONTINUED | OUTPATIENT
Start: 2024-10-14 | End: 2024-10-14 | Stop reason: ALTCHOICE

## 2024-10-14 RX ORDER — FENTANYL CITRATE 0.05 MG/ML
50 INJECTION, SOLUTION INTRAMUSCULAR; INTRAVENOUS EVERY 5 MIN PRN
Status: DISCONTINUED | OUTPATIENT
Start: 2024-10-14 | End: 2024-10-14 | Stop reason: HOSPADM

## 2024-10-14 RX ORDER — ACETAMINOPHEN 500 MG
1000 TABLET ORAL ONCE
Status: COMPLETED | OUTPATIENT
Start: 2024-10-14 | End: 2024-10-14

## 2024-10-14 RX ORDER — SODIUM CHLORIDE 9 MG/ML
INJECTION, SOLUTION INTRAVENOUS PRN
Status: DISCONTINUED | OUTPATIENT
Start: 2024-10-14 | End: 2024-10-14 | Stop reason: HOSPADM

## 2024-10-14 RX ORDER — PROPOFOL 10 MG/ML
INJECTION, EMULSION INTRAVENOUS
Status: DISCONTINUED | OUTPATIENT
Start: 2024-10-14 | End: 2024-10-14 | Stop reason: SDUPTHER

## 2024-10-14 RX ORDER — SODIUM CHLORIDE, SODIUM LACTATE, POTASSIUM CHLORIDE, CALCIUM CHLORIDE 600; 310; 30; 20 MG/100ML; MG/100ML; MG/100ML; MG/100ML
INJECTION, SOLUTION INTRAVENOUS CONTINUOUS
Status: DISCONTINUED | OUTPATIENT
Start: 2024-10-14 | End: 2024-10-14 | Stop reason: HOSPADM

## 2024-10-14 RX ORDER — LIDOCAINE HYDROCHLORIDE 20 MG/ML
INJECTION, SOLUTION EPIDURAL; INFILTRATION; INTRACAUDAL; PERINEURAL
Status: DISCONTINUED | OUTPATIENT
Start: 2024-10-14 | End: 2024-10-14 | Stop reason: SDUPTHER

## 2024-10-14 RX ORDER — NALOXONE HYDROCHLORIDE 0.4 MG/ML
INJECTION, SOLUTION INTRAMUSCULAR; INTRAVENOUS; SUBCUTANEOUS PRN
Status: DISCONTINUED | OUTPATIENT
Start: 2024-10-14 | End: 2024-10-14 | Stop reason: HOSPADM

## 2024-10-14 RX ORDER — MIDAZOLAM HYDROCHLORIDE 1 MG/ML
INJECTION INTRAMUSCULAR; INTRAVENOUS
Status: DISCONTINUED | OUTPATIENT
Start: 2024-10-14 | End: 2024-10-14 | Stop reason: SDUPTHER

## 2024-10-14 RX ORDER — DEXAMETHASONE SODIUM PHOSPHATE 4 MG/ML
INJECTION, SOLUTION INTRA-ARTICULAR; INTRALESIONAL; INTRAMUSCULAR; INTRAVENOUS; SOFT TISSUE
Status: DISCONTINUED | OUTPATIENT
Start: 2024-10-14 | End: 2024-10-14 | Stop reason: SDUPTHER

## 2024-10-14 RX ADMIN — FENTANYL CITRATE 50 MCG: 0.05 INJECTION, SOLUTION INTRAMUSCULAR; INTRAVENOUS at 14:22

## 2024-10-14 RX ADMIN — LIDOCAINE HYDROCHLORIDE 80 MG: 20 INJECTION, SOLUTION EPIDURAL; INFILTRATION; INTRACAUDAL; PERINEURAL at 13:24

## 2024-10-14 RX ADMIN — PROPOFOL 200 MG: 10 INJECTION, EMULSION INTRAVENOUS at 13:24

## 2024-10-14 RX ADMIN — FENTANYL CITRATE 100 MCG: 50 INJECTION INTRAMUSCULAR; INTRAVENOUS at 13:24

## 2024-10-14 RX ADMIN — FENTANYL CITRATE 50 MCG: 50 INJECTION INTRAMUSCULAR; INTRAVENOUS at 13:43

## 2024-10-14 RX ADMIN — SODIUM CHLORIDE, POTASSIUM CHLORIDE, SODIUM LACTATE AND CALCIUM CHLORIDE: 600; 310; 30; 20 INJECTION, SOLUTION INTRAVENOUS at 13:18

## 2024-10-14 RX ADMIN — GABAPENTIN 300 MG: 300 CAPSULE ORAL at 13:02

## 2024-10-14 RX ADMIN — ACETAMINOPHEN 1000 MG: 500 TABLET ORAL at 13:02

## 2024-10-14 RX ADMIN — DEXAMETHASONE SODIUM PHOSPHATE 4 MG: 4 INJECTION INTRA-ARTICULAR; INTRALESIONAL; INTRAMUSCULAR; INTRAVENOUS; SOFT TISSUE at 13:29

## 2024-10-14 RX ADMIN — ONDANSETRON 4 MG: 2 INJECTION, SOLUTION INTRAMUSCULAR; INTRAVENOUS at 13:29

## 2024-10-14 RX ADMIN — HYDROCODONE BITARTRATE AND ACETAMINOPHEN 1 TABLET: 5; 325 TABLET ORAL at 15:06

## 2024-10-14 RX ADMIN — MIDAZOLAM 2 MG: 1 INJECTION INTRAMUSCULAR; INTRAVENOUS at 13:19

## 2024-10-14 ASSESSMENT — ENCOUNTER SYMPTOMS
SHORTNESS OF BREATH: 0
NAUSEA: 0
ABDOMINAL PAIN: 0
SINUS PRESSURE: 0

## 2024-10-14 ASSESSMENT — PAIN DESCRIPTION - ORIENTATION
ORIENTATION: RIGHT

## 2024-10-14 ASSESSMENT — PAIN DESCRIPTION - DESCRIPTORS
DESCRIPTORS: TENDER
DESCRIPTORS: ACHING
DESCRIPTORS: TENDER
DESCRIPTORS: TENDER

## 2024-10-14 ASSESSMENT — PAIN - FUNCTIONAL ASSESSMENT
PAIN_FUNCTIONAL_ASSESSMENT: 0-10

## 2024-10-14 ASSESSMENT — PAIN DESCRIPTION - ONSET: ONSET: ON-GOING

## 2024-10-14 ASSESSMENT — PAIN SCALES - GENERAL
PAINLEVEL_OUTOF10: 7
PAINLEVEL_OUTOF10: 6
PAINLEVEL_OUTOF10: 0
PAINLEVEL_OUTOF10: 3
PAINLEVEL_OUTOF10: 5
PAINLEVEL_OUTOF10: 3

## 2024-10-14 ASSESSMENT — PAIN DESCRIPTION - PAIN TYPE
TYPE: SURGICAL PAIN

## 2024-10-14 ASSESSMENT — PAIN DESCRIPTION - LOCATION
LOCATION: KNEE

## 2024-10-14 ASSESSMENT — PAIN DESCRIPTION - FREQUENCY: FREQUENCY: CONTINUOUS

## 2024-10-14 NOTE — ANESTHESIA PRE PROCEDURE
GLUCOSE 138 09/30/2024 08:45 AM    CALCIUM 9.4 09/30/2024 08:45 AM       POC Tests: No results for input(s): \"POCGLU\", \"POCNA\", \"POCK\", \"POCCL\", \"POCBUN\", \"POCHEMO\", \"POCHCT\" in the last 72 hours.    Coags: No results found for: \"PROTIME\", \"INR\", \"APTT\"    HCG (If Applicable): No results found for: \"PREGTESTUR\", \"PREGSERUM\", \"HCG\", \"HCGQUANT\"     ABGs: No results found for: \"PHART\", \"PO2ART\", \"PPZ8XXW\", \"ERW2KIX\", \"BEART\", \"Z5ZJRGSX\"     Type & Screen (If Applicable):  No results found for: \"ABORH\", \"LABANTI\"    Drug/Infectious Status (If Applicable):  No results found for: \"HIV\", \"HEPCAB\"    COVID-19 Screening (If Applicable): No results found for: \"COVID19\"        Anesthesia Evaluation  Patient summary reviewed and Nursing notes reviewed   no history of anesthetic complications:   Airway: Mallampati: III  TM distance: >3 FB   Neck ROM: full  Mouth opening: > = 3 FB   Dental: normal exam         Pulmonary:Negative Pulmonary ROS and normal exam  breath sounds clear to auscultation                             Cardiovascular:    (+) hypertension:, hyperlipidemia        Rhythm: regular  Rate: normal           Beta Blocker:  Not on Beta Blocker         Neuro/Psych:   Negative Neuro/Psych ROS              GI/Hepatic/Renal: Neg GI/Hepatic/Renal ROS            Endo/Other:    (+) Diabetes (Pre-diabetes), : arthritis:..                  ROS comment: Tear of medial meniscus of right knee Abdominal:             Vascular: negative vascular ROS.         Other Findings:       Anesthesia Plan      general     ASA 2     (LMA)  Induction: intravenous.    MIPS: Postoperative opioids intended and Prophylactic antiemetics administered.  Anesthetic plan and risks discussed with patient.      Plan discussed with CRNA.                Natalie Heath MD   10/14/2024

## 2024-10-14 NOTE — H&P
HISTORY and PHYSICAL  [place]       NAME:  Osvaldo Aguirre  MRN: [id number]   YOB: 1965   Date: 10/14/2024   Age: 59 y.o.  Gender: male       COMPLAINT AND PRESENT HISTORY:     Osvaldo Aguirre is 59 y.o.,  male, undergoing for : Pre-Op Diagnosis Codes:      * Tear of medial meniscus of right knee, current, unspecified tear type    Patient will be having:   Procedure(s):  KNEE ARTHROSCOPY PARTIAL MEDIAL MENISCECTOMY    Office note per  on  8/21/24, italicized below.  History of Present Illness:  This is a 59 y.o. male who presents to the clinic today for evaluation / follow up of right knee injury.  Patient is a 59-year-old gentleman who injured his knee on 8/23/2023 when he had a fall.  He is originally was seen by Dr. Frey who has since moved away from a [facility] area patient's been having to deal with a Worker's Comp. for quite some time and he finally got approval for this as well as approval for MRI he did have an MRI performed on 10/11/2023 as indicated below  Patient does state that he gets sharp stabbing catching pain when he twists or turns when he is on even surfaces or getting out of chairs..   Above reviewed with patient and he concurs    UPDATE:     Patient had injury to the left knee.(See above)  Patient denies any prior knee surgery.      Patient  continues to c/o pain , swelling, stiffness, popping ,cracking in the  right Knee.     Pt describes and rates  the pain as intermittent sharp and stabbing more with movement 6/10 in intensity on average.      Pt states he had fracture to right ankle, and had a fall with it as well.     Onset of symptoms started 14 months ago,  that has progressively gotten worse since onset.     Patient reports that  weight bearing on the right  aggravates sxs the most.     Patient has been using  mobic, Voltaren gel, Voltaren oral, elevation to help in pain relief.      Pt  had physical therapy for his right.    No history of

## 2024-10-14 NOTE — ANESTHESIA POSTPROCEDURE EVALUATION
Department of Anesthesiology  Postprocedure Note    Patient: Osvaldo Aguirre  MRN: [id number]  YOB: 1965  Date of evaluation: 10/14/2024    Procedure Summary       Date: 10/14/24 Room / Location: [address]    Anesthesia Start: 1318 Anesthesia Stop: 1401    Procedure: KNEE ARTHROSCOPY PARTIAL MEDIAL MENISCECTOMY (Right: Knee) Diagnosis:       Tear of medial meniscus of right knee, current, unspecified tear type, initial encounter      (Tear of medial meniscus of right knee, current, unspecified tear type, initial encounter [S83.241A])    Surgeons: Reji Talavera MD Responsible Provider: Natalie Heath MD    Anesthesia Type: general ASA Status: 2            Anesthesia Type: No value filed.    Rachel Phase I: Rachel Score: 10    Rachel Phase II:      Anesthesia Post Evaluation    Comments: POST- ANESTHESIA EVALUATION       Pt Name: Osvaldo Aguirre  MRN: [id number]  YOB: 1965  Date of evaluation: 10/14/2024  Time:  3:02 PM      BP (!) 150/92   Pulse 90   Temp 98 °F (36.7 °C)   Resp 17   Ht 1.791 m (5' 10.5\")   Wt 130.2 kg (287 lb)   SpO2 93%   BMI 40.60 kg/m²      Consciousness Level  Awake  Cardiopulmonary Status  Stable  Pain Adequately Treated YES  Nausea / Vomiting  NO  Adequate Hydration  YES  Anesthesia Related Complications NONE      Electronically signed by Natalie Heath MD on 10/14/2024 at 3:02 PM           No notable events documented.

## 2024-10-14 NOTE — OP NOTE
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       Procedure(s):  KNEE ARTHROSCOPY PARTIAL MEDIAL MENISCECTOMY right knee    Surgeon(s):  Reji Talavera MD    Assistant:   Resident: Padua, Fortunato G, MD    Anesthesia: General    Estimated Blood Loss (mL): Minimal    Complications: None    Specimens:   * No specimens in log *    Implants:  * No implants in log *      Drains: * No LDAs found *    Findings:  Infection Present At Time Of Surgery (PATOS) (choose all levels that have infection present):  No infection present  Other Findings: Tear of the posterior horn medial meniscus right knee as well as grade IV chondromalacia patellofemoral joint    Detailed Description of Procedure:     Patient is a 59 y.o. year old male who presents with a history of pain, locking, and giving away sensations of their right knee. Physical examination was positive for Neelam's examination. MRI was consistent with a tear of the posterior horn of the medial meniscus. Having failed conservative treatment, it was advised that arthroscopic examination and treatment of their knee would be beneficial and consent was obtained with risk and benefits explained to the patient. The patient was taken tot he operative room and general anesthesia was administered. A tourniquet was placed to the operatives lower extremity and then placed in the leg waiet. The leg was exsanguinated and the tourniquet inflated to the 300mmHg. The leg was the prepped and draped in the usual sterile fashion. Time-out was called to verify laterality.     Medial and lateral portals were made and the scope placed in the lateral portal. The patella femoral joint was examined

## 2024-10-14 NOTE — DISCHARGE INSTRUCTIONS
your incision comes open.     Bright red blood has soaked through the bandage over your incision.     You have signs of infection, such as:  Increased pain, swelling, warmth, or redness.  Red streaks leading from the incisions.  Pus draining from the incisions.  A fever.   Watch closely for any changes in your health, and be sure to contact your doctor if:    You do not have a bowel movement after taking a laxative.   Where can you learn more?  Go to https://www.DIY.net/patientEd and enter I338 to learn more about \"Knee Arthroscopy: What to Expect at Home.\"  Current as of: July 17, 2023  Content Version: 14.2  © 2024 [employer].   Care instructions adapted under license by [employer]. If you have questions about a medical condition or this instruction, always ask your healthcare professional. [employer] disclaims any warranty or liability for your use of this information.    [facility]   Dr. Reji Talavera M.D.  [phone]  POST OPERATIVE DISCHARGE INSTRUCTIONS   KNEE ARTHROSCOPY     Follow-up in office seven to ten days after surgery.  Call for appointment if not already made. ([phone]).    Take pain medication as ordered.    Keep the dressing/ace wrap on for 72 hours (3 days).  After the 72 hours, may remove ace wrap and dressing, place Band-Aids over sutures and then if desired reapply ace wrap.  Start wrapping at the ankle and wrap up to the top of the leg.    You may shower, but keep the dressing & wounds dry with plastic bag around knee/leg until seen by Dr. Talavera.    After surgery, it is weight bearing as tolerated, unless instructed differently by Dr. Talavera after surgery.  Use crutches or a walker as needed based on how your knee feels.    Be sure to keep your leg elevated when sitting or lying down.  Use 2-3 pillows under leg.    Ice to surgical site for 20 to 30 minutes four times a day for 48 hours.    Dr. Talavera recommends taking one Aspirin

## 2024-10-30 ENCOUNTER — OFFICE VISIT (OUTPATIENT)
Dept: ORTHOPEDIC SURGERY | Age: 59
End: 2024-10-30

## 2024-10-30 ENCOUNTER — TELEPHONE (OUTPATIENT)
Dept: ORTHOPEDIC SURGERY | Age: 59
End: 2024-10-30

## 2024-10-30 DIAGNOSIS — S83.8X1D INJURY OF MENISCUS OF RIGHT KNEE, SUBSEQUENT ENCOUNTER: Primary | ICD-10-CM

## 2024-10-30 NOTE — TELEPHONE ENCOUNTER
Patient was seen in office today.    Return to work date needs to be changed from 11/11/24 to 12/2/24 per Dr Talavera.    Thank you

## 2024-10-30 NOTE — PROGRESS NOTES
Patient returns today status post right knee arthroscopy with partial (medial/lateral) meniscectomy. Patient has no major complaints other than expected tightness/swelling with ROM. Sharp/stabbing pain has improved.     On exam, portal sites are without redness or drainage. No calf tenderness; negative Yvonne's sign. Motion is 0-100 degrees. No significant effusion.     Assessment  Status post right knee arthroscopy    Plan  Patient given exercises to perform. Patient given activities/ motions to complete. Continue activities at home. Return to work. RTO PRN. Call with any future problems.

## 2024-11-04 NOTE — TELEPHONE ENCOUNTER
Patient calls complaining of petechiae like rash of right leg. Patient reports that this has been present for ~3 weeks and was shown to Dr. Talavera at 10/30/24 office visit. Patient has been utilizing benadryl and cortisone cream and seen no relief in symptoms. Patient inquiring if he needs an antibiotic. Please advise.

## 2024-11-08 RX ORDER — METHYLPREDNISOLONE 4 MG/1
TABLET ORAL
Qty: 1 KIT | Refills: 0 | Status: SHIPPED | OUTPATIENT
Start: 2024-11-08 | End: 2024-11-12

## 2024-11-25 ENCOUNTER — TELEPHONE (OUTPATIENT)
Dept: ORTHOPEDIC SURGERY | Age: 59
End: 2024-11-25

## (undated) DEVICE — [TOMCAT CUTTER, ARTHROSCOPIC SHAVER BLADE,  DO NOT RESTERILIZE,  DO NOT USE IF PACKAGE IS DAMAGED,  KEEP DRY,  KEEP AWAY FROM SUNLIGHT]: Brand: FORMULA

## (undated) DEVICE — SINGLE PORT MANIFOLD: Brand: NEPTUNE 2

## (undated) DEVICE — SOLUTION IRRIG 3000ML LAC RINGERS ARTHROMTC PLAS CONT

## (undated) DEVICE — ST CHARLES KNEE ARTHRO: Brand: MEDLINE INDUSTRIES, INC.

## (undated) DEVICE — DRESSING,GAUZE,XEROFORM,CURAD,1"X8",ST: Brand: CURAD

## (undated) DEVICE — GLOVE ORTHO 8   MSG9480

## (undated) DEVICE — ZIMMER® STERILE DISPOSABLE TOURNIQUET CUFF WITH PLC, DUAL PORT, SINGLE BLADDER, 30 IN. (76 CM)